# Patient Record
Sex: FEMALE | URBAN - METROPOLITAN AREA
[De-identification: names, ages, dates, MRNs, and addresses within clinical notes are randomized per-mention and may not be internally consistent; named-entity substitution may affect disease eponyms.]

---

## 2017-01-25 ENCOUNTER — GENERIC CONVERSION - ENCOUNTER (OUTPATIENT)
Dept: OTHER | Facility: OTHER | Age: 77
End: 2017-01-25

## 2018-01-10 NOTE — MISCELLANEOUS
Reason For Visit  Reason For Visit Free Text Note Form: Met with pt  and  during visit to Palliative Care office to review Hospice/Homecare services  Case Management Documentation [facility]:   Information obtained from the patient and patient's spouse  She is also dealing with additional issues such as family crisis, adjustment to illness and chronic/terminal disease  Action Plan: supportive counseling/advocacy and information provided  plan reviewed  Progress Note  Patient reports dx  of Muscular Dystrophy at age 52  She reports doing well until post menopause when symptoms began to worsen   admits rapid decline in condition over last 3 months and pt  no longer able to go out of the house for leisure, only to attend MD appointments  Pt  admits that she is tired of living with her symptoms and is ready for her life as it is, to be over   is caregiver for pt  at home however he is working three days per week  Pt  is interested in having support at home for herself and   Thoroughly reviewed Hospice/Homecare services and supportive counseling provided   reports that he and pt  will discuss options and contact [facility] as needed  Will continue to be available to provide support        Signatures   Electronically signed by : DALILA Villafuerte; Nov 7 2016  3:26PM EST                       (Author)

## 2018-01-12 NOTE — MISCELLANEOUS
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

## 2018-01-14 NOTE — MISCELLANEOUS
Reason For Visit  Reason For Visit Free Text Note Form: Received call from pt  who is requesting information regarding Palliative Care services, Hospice Care and [facility] Care  Reviewed all information with pt  She is interested in having assistance at home with [facility] type care such as preparing meals etc  when her  is at work  Reviewed insurance coverage for [facility] services and also reviewed requirement of need for Skilled services in order for insurance to pay with MD order  Pt  reports she will speak with her PCP about obtaining an order  Also reviewed private duty homecare and pt  reports she is able to pay for private care  She will contact her PCP first and call back with any further needs  Supportive counseling provided  Will continue to be available to provide support  Active Problems    1  Chronic back pain greater than 3 months duration (724 5,338 29) (M54 9,G89 29)   2  Dysphagia, oropharyngeal phase (787 22) (R13 12)   3  Muscular dystrophy (359 1) (G71 0)   4  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